# Patient Record
Sex: MALE | URBAN - NONMETROPOLITAN AREA
[De-identification: names, ages, dates, MRNs, and addresses within clinical notes are randomized per-mention and may not be internally consistent; named-entity substitution may affect disease eponyms.]

---

## 2021-06-09 ENCOUNTER — OFFICE VISIT (OUTPATIENT)
Dept: URBAN - NONMETROPOLITAN AREA CLINIC 9 | Facility: CLINIC | Age: 66
End: 2021-06-09

## 2021-06-15 ENCOUNTER — OFFICE VISIT (OUTPATIENT)
Dept: URBAN - METROPOLITAN AREA CLINIC 13 | Facility: CLINIC | Age: 66
End: 2021-06-15

## 2021-06-22 ENCOUNTER — OFFICE VISIT (OUTPATIENT)
Dept: URBAN - METROPOLITAN AREA SURGERY CENTER 27 | Facility: SURGERY CENTER | Age: 66
End: 2021-06-22

## 2021-06-22 ENCOUNTER — CLAIMS CREATED FROM THE CLAIM WINDOW (OUTPATIENT)
Dept: URBAN - METROPOLITAN AREA CLINIC 4 | Facility: CLINIC | Age: 66
End: 2021-06-22
Payer: MEDICARE

## 2021-06-22 DIAGNOSIS — K31.89 MESENTEROAXIAL GASTRIC VOLVULUS: ICD-10-CM

## 2021-06-22 PROBLEM — 91175000 SEIZURE: Status: ACTIVE | Noted: 2021-06-22

## 2021-06-22 PROCEDURE — 88305 TISSUE EXAM BY PATHOLOGIST: CPT | Performed by: PATHOLOGY

## 2021-06-22 PROCEDURE — 88312 SPECIAL STAINS GROUP 1: CPT | Performed by: PATHOLOGY

## 2021-06-29 ENCOUNTER — OFFICE VISIT (OUTPATIENT)
Dept: URBAN - METROPOLITAN AREA CLINIC 13 | Facility: CLINIC | Age: 66
End: 2021-06-29

## 2021-06-30 ENCOUNTER — OFFICE VISIT (OUTPATIENT)
Dept: URBAN - METROPOLITAN AREA CLINIC 13 | Facility: CLINIC | Age: 66
End: 2021-06-30

## 2021-07-26 ENCOUNTER — OFFICE VISIT (OUTPATIENT)
Dept: URBAN - METROPOLITAN AREA CLINIC 44 | Facility: CLINIC | Age: 66
End: 2021-07-26

## 2021-08-28 ENCOUNTER — TELEPHONE ENCOUNTER (OUTPATIENT)
Dept: URBAN - METROPOLITAN AREA CLINIC 13 | Facility: CLINIC | Age: 66
End: 2021-08-28

## 2021-08-29 ENCOUNTER — TELEPHONE ENCOUNTER (OUTPATIENT)
Dept: URBAN - METROPOLITAN AREA CLINIC 13 | Facility: CLINIC | Age: 66
End: 2021-08-29

## 2021-08-29 RX ORDER — SUCRALFATE 1 G/1
TABLET ORAL
Status: ACTIVE | COMMUNITY

## 2021-08-29 RX ORDER — PANTOPRAZOLE SODIUM 40 MG/1
TABLET, DELAYED RELEASE ORAL
Status: ACTIVE | COMMUNITY

## 2021-09-07 ENCOUNTER — DASHBOARD ENCOUNTERS (OUTPATIENT)
Age: 66
End: 2021-09-07

## 2021-09-08 ENCOUNTER — OFFICE VISIT (OUTPATIENT)
Dept: URBAN - METROPOLITAN AREA CLINIC 44 | Facility: CLINIC | Age: 66
End: 2021-09-08

## 2021-09-08 RX ORDER — SUCRALFATE 1 G/1
TABLET ORAL
Status: ACTIVE | COMMUNITY

## 2021-09-08 RX ORDER — PANTOPRAZOLE SODIUM 40 MG/1
TABLET, DELAYED RELEASE ORAL
Status: ACTIVE | COMMUNITY

## 2021-09-08 NOTE — HPI-GERD
66 year old male presents for follow up of anemia. The patient was hospitalized in May for near syncope. Hgb was 3.3. Pt received 7 units PRBC and 1 unit plasma. He had some dark stools. EGD at that time showed a duodenal ulcer with a visible vessel s/p clips and hemospray. The patient was started on iron, Carafate, and Pantoprazole.  June to July Hgb was 7.9-->8.3. Ferritin was 6. Currently, the patient feels much improved. He received IV iron last Friday and is due this Friday. Hgb 712/21-->7/23/21 10.7-->10.3. The pt is to see hematologist this Friday.

## 2022-04-30 ENCOUNTER — TELEPHONE ENCOUNTER (OUTPATIENT)
Dept: URBAN - METROPOLITAN AREA CLINIC 121 | Facility: CLINIC | Age: 67
End: 2022-04-30

## 2022-04-30 RX ORDER — AMLODIPINE BESYLATE 2.5 MG
TABLET ORAL
OUTPATIENT
Start: 2008-06-16

## 2022-04-30 RX ORDER — METHOTREXATE 2.5 MG/1
TABLET ORAL
OUTPATIENT
Start: 2008-06-16

## 2022-04-30 RX ORDER — MAXZIDE 37.5; 25 MG/1; MG/1
TABLET ORAL
OUTPATIENT
Start: 2008-06-16

## 2022-04-30 RX ORDER — ESOMEPRAZOLE MAGNESIUM 40 MG
1 PO QD CAPSULE,DELAYED RELEASE (ENTERIC COATED) ORAL
OUTPATIENT
Start: 2010-06-03 | End: 2011-12-14

## 2022-04-30 RX ORDER — POTASSIUM GLUCONATE 2 MEQ
TABLET ORAL
OUTPATIENT
Start: 2008-06-16

## 2022-04-30 RX ORDER — METHOTREXATE 2.5 MG/1
TABLET ORAL
OUTPATIENT
Start: 2008-06-16 | End: 2011-12-14

## 2022-04-30 RX ORDER — ESOMEPRAZOLE MAGNESIUM 40 MG
TAKE 1 PO QD CAPSULE,DELAYED RELEASE (ENTERIC COATED) ORAL
OUTPATIENT
Start: 2009-03-03

## 2022-04-30 RX ORDER — FOLIC ACID 1 MG/1
TABLET ORAL
OUTPATIENT
Start: 2008-06-16 | End: 2011-12-14

## 2022-04-30 RX ORDER — ESOMEPRAZOLE MAGNESIUM 40 MG
1 PO QD CAPSULE,DELAYED RELEASE (ENTERIC COATED) ORAL
OUTPATIENT
Start: 2010-06-03

## 2022-04-30 RX ORDER — POTASSIUM GLUCONATE 2 MEQ
TABLET ORAL
OUTPATIENT
Start: 2008-06-16 | End: 2012-12-11

## 2022-04-30 RX ORDER — ESOMEPRAZOLE MAGNESIUM 40 MG
TAKE 1 PO QD CAPSULE,DELAYED RELEASE (ENTERIC COATED) ORAL
OUTPATIENT
Start: 2009-03-03 | End: 2012-12-11

## 2022-04-30 RX ORDER — FOLIC ACID 1 MG/1
TABLET ORAL
OUTPATIENT
Start: 2008-06-16

## 2022-05-01 ENCOUNTER — TELEPHONE ENCOUNTER (OUTPATIENT)
Dept: URBAN - METROPOLITAN AREA CLINIC 121 | Facility: CLINIC | Age: 67
End: 2022-05-01

## 2022-05-01 RX ORDER — OMEPRAZOLE 20 MG/1
1 CAPSULE PO QD CAPSULE, DELAYED RELEASE ORAL
Status: ACTIVE | COMMUNITY
Start: 2013-08-02

## 2022-05-01 RX ORDER — MAXZIDE 37.5; 25 MG/1; MG/1
QD TABLET ORAL
Status: ACTIVE | COMMUNITY
Start: 2012-12-11

## 2022-05-01 RX ORDER — MESALAMINE 1000 MG/1
1 PR Q A.M SUPPOSITORY RECTAL
Status: ACTIVE | COMMUNITY
Start: 2011-12-22

## 2022-05-01 RX ORDER — AMLODIPINE BESYLATE 2.5 MG
QD TABLET ORAL
Status: ACTIVE | COMMUNITY
Start: 2012-12-11

## 2022-05-01 RX ORDER — HYDROCORTISONE 100 MG/60ML
1 ENEMA Q HS ENEMA RECTAL
Status: ACTIVE | COMMUNITY
Start: 2011-12-22